# Patient Record
Sex: FEMALE | HISPANIC OR LATINO | URBAN - METROPOLITAN AREA
[De-identification: names, ages, dates, MRNs, and addresses within clinical notes are randomized per-mention and may not be internally consistent; named-entity substitution may affect disease eponyms.]

---
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
## 2024-03-21 ENCOUNTER — NON-APPOINTMENT (OUTPATIENT)
Age: 19
End: 2024-03-21

## 2024-08-20 ENCOUNTER — APPOINTMENT (OUTPATIENT)
Dept: OBGYN | Facility: CLINIC | Age: 19
End: 2024-08-20
Payer: MEDICAID

## 2024-08-20 VITALS
OXYGEN SATURATION: 99 % | SYSTOLIC BLOOD PRESSURE: 125 MMHG | TEMPERATURE: 98.1 F | HEART RATE: 87 BPM | DIASTOLIC BLOOD PRESSURE: 81 MMHG

## 2024-08-20 DIAGNOSIS — Z30.46 ENCOUNTER FOR SURVEILLANCE OF IMPLANTABLE SUBDERMAL CONTRACEPTIVE: ICD-10-CM

## 2024-08-20 PROCEDURE — 11982 REMOVE DRUG IMPLANT DEVICE: CPT

## 2024-08-20 NOTE — HISTORY OF PRESENT ILLNESS
[FreeTextEntry1] : 19Y F LMP 8/18/2024 presents for Nexplanon removal. Patient reports having irregular vaginal bleeding lasting for a month. Patient is not sexually active and does not desires birth control at this time. Discussed buying condoms as back up measure.  nexplanon removal HcG: negative  The patient was counseled on the risks, benefits and alternatives of the subdermal implant.  The patient desires removal . No UPI since LMP  Pre-operative time out performed.  Patients name, date of birth and procedure confirmed.  The patients [Left][Right] arm was prepped with Betadyne and 1cc of 1% lidocaine was injected.   2 mm stab incision made along [distal] [proximal] edge of implant using scalpel and implant grasped.  Implant removed, measured and noted to be 4 cm long.  Incision examined and noted to be hemostatic.  Steristrip was applied with instructions to keep on x 72 hours.  Pressure dressing was applied using sterile gauze, with instructions to keep on x 24 hours.  Patient counseled that they have immediate return to fertility and can get pregnant at this time.  The patient tolerated the procedure well. EBL: minimal

## 2024-08-20 NOTE — PLAN
[FreeTextEntry1] : 19y F s/p Nexplanon removal  #contraception -Nexplanon removed intact with no complications -Patient does not desire birth control at this time -Patient aware of rapid return to fertility. -Discussed condom use with patient - F/u [facility]